# Patient Record
Sex: MALE | Race: BLACK OR AFRICAN AMERICAN | NOT HISPANIC OR LATINO | Employment: OTHER | URBAN - METROPOLITAN AREA
[De-identification: names, ages, dates, MRNs, and addresses within clinical notes are randomized per-mention and may not be internally consistent; named-entity substitution may affect disease eponyms.]

---

## 2021-12-06 ENCOUNTER — APPOINTMENT (OUTPATIENT)
Dept: GENERAL RADIOLOGY | Facility: CLINIC | Age: 41
End: 2021-12-06
Attending: EMERGENCY MEDICINE
Payer: COMMERCIAL

## 2021-12-06 ENCOUNTER — HOSPITAL ENCOUNTER (EMERGENCY)
Facility: CLINIC | Age: 41
Discharge: HOME | End: 2021-12-06
Attending: EMERGENCY MEDICINE | Admitting: EMERGENCY MEDICINE
Payer: COMMERCIAL

## 2021-12-06 VITALS
RESPIRATION RATE: 18 BRPM | SYSTOLIC BLOOD PRESSURE: 170 MMHG | DIASTOLIC BLOOD PRESSURE: 106 MMHG | TEMPERATURE: 97.4 F | HEART RATE: 76 BPM | OXYGEN SATURATION: 96 %

## 2021-12-06 DIAGNOSIS — M79.622 PAIN OF LEFT UPPER ARM: ICD-10-CM

## 2021-12-06 DIAGNOSIS — I10 HYPERTENSION, UNSPECIFIED TYPE: ICD-10-CM

## 2021-12-06 DIAGNOSIS — E11.65 TYPE 2 DIABETES MELLITUS WITH HYPERGLYCEMIA, WITHOUT LONG-TERM CURRENT USE OF INSULIN (H): ICD-10-CM

## 2021-12-06 DIAGNOSIS — T14.8XXA MUSCLE STRAIN: ICD-10-CM

## 2021-12-06 LAB
ANION GAP SERPL CALCULATED.3IONS-SCNC: 6 MMOL/L (ref 3–14)
ATRIAL RATE - MUSE: 64 BPM
BASOPHILS # BLD AUTO: 0 10E3/UL (ref 0–0.2)
BASOPHILS NFR BLD AUTO: 0 %
BUN SERPL-MCNC: 13 MG/DL (ref 7–30)
CALCIUM SERPL-MCNC: 9.2 MG/DL (ref 8.5–10.1)
CHLORIDE BLD-SCNC: 104 MMOL/L (ref 94–109)
CO2 SERPL-SCNC: 27 MMOL/L (ref 20–32)
CREAT SERPL-MCNC: 1.18 MG/DL (ref 0.66–1.25)
DIASTOLIC BLOOD PRESSURE - MUSE: NORMAL MMHG
EOSINOPHIL # BLD AUTO: 0.3 10E3/UL (ref 0–0.7)
EOSINOPHIL NFR BLD AUTO: 3 %
ERYTHROCYTE [DISTWIDTH] IN BLOOD BY AUTOMATED COUNT: 12.6 % (ref 10–15)
GFR SERPL CREATININE-BSD FRML MDRD: 77 ML/MIN/1.73M2
GLUCOSE BLD-MCNC: 124 MG/DL (ref 70–99)
HCT VFR BLD AUTO: 44.8 % (ref 40–53)
HGB BLD-MCNC: 15 G/DL (ref 13.3–17.7)
IMM GRANULOCYTES # BLD: 0 10E3/UL
IMM GRANULOCYTES NFR BLD: 0 %
INTERPRETATION ECG - MUSE: NORMAL
LYMPHOCYTES # BLD AUTO: 2.7 10E3/UL (ref 0.8–5.3)
LYMPHOCYTES NFR BLD AUTO: 31 %
MCH RBC QN AUTO: 30.8 PG (ref 26.5–33)
MCHC RBC AUTO-ENTMCNC: 33.5 G/DL (ref 31.5–36.5)
MCV RBC AUTO: 92 FL (ref 78–100)
MONOCYTES # BLD AUTO: 0.8 10E3/UL (ref 0–1.3)
MONOCYTES NFR BLD AUTO: 9 %
NEUTROPHILS # BLD AUTO: 4.9 10E3/UL (ref 1.6–8.3)
NEUTROPHILS NFR BLD AUTO: 57 %
NRBC # BLD AUTO: 0 10E3/UL
NRBC BLD AUTO-RTO: 0 /100
P AXIS - MUSE: 13 DEGREES
PLATELET # BLD AUTO: 209 10E3/UL (ref 150–450)
POTASSIUM BLD-SCNC: 3.8 MMOL/L (ref 3.4–5.3)
PR INTERVAL - MUSE: 178 MS
QRS DURATION - MUSE: 92 MS
QT - MUSE: 408 MS
QTC - MUSE: 420 MS
R AXIS - MUSE: -33 DEGREES
RBC # BLD AUTO: 4.87 10E6/UL (ref 4.4–5.9)
SODIUM SERPL-SCNC: 137 MMOL/L (ref 133–144)
SYSTOLIC BLOOD PRESSURE - MUSE: NORMAL MMHG
T AXIS - MUSE: -16 DEGREES
TROPONIN I SERPL HS-MCNC: 10 NG/L
VENTRICULAR RATE- MUSE: 64 BPM
WBC # BLD AUTO: 8.8 10E3/UL (ref 4–11)

## 2021-12-06 PROCEDURE — 36415 COLL VENOUS BLD VENIPUNCTURE: CPT | Performed by: EMERGENCY MEDICINE

## 2021-12-06 PROCEDURE — 250N000013 HC RX MED GY IP 250 OP 250 PS 637: Performed by: EMERGENCY MEDICINE

## 2021-12-06 PROCEDURE — 71046 X-RAY EXAM CHEST 2 VIEWS: CPT

## 2021-12-06 PROCEDURE — 80048 BASIC METABOLIC PNL TOTAL CA: CPT | Performed by: EMERGENCY MEDICINE

## 2021-12-06 PROCEDURE — 84484 ASSAY OF TROPONIN QUANT: CPT | Performed by: EMERGENCY MEDICINE

## 2021-12-06 PROCEDURE — 85004 AUTOMATED DIFF WBC COUNT: CPT | Performed by: EMERGENCY MEDICINE

## 2021-12-06 PROCEDURE — 99285 EMERGENCY DEPT VISIT HI MDM: CPT | Mod: 25

## 2021-12-06 PROCEDURE — 93005 ELECTROCARDIOGRAM TRACING: CPT

## 2021-12-06 RX ORDER — IBUPROFEN 200 MG
400 TABLET ORAL ONCE
Status: COMPLETED | OUTPATIENT
Start: 2021-12-06 | End: 2021-12-06

## 2021-12-06 RX ORDER — METHOCARBAMOL 750 MG/1
750 TABLET, FILM COATED ORAL 4 TIMES DAILY PRN
Qty: 15 TABLET | Refills: 0 | Status: SHIPPED | OUTPATIENT
Start: 2021-12-06 | End: 2022-02-10

## 2021-12-06 RX ADMIN — IBUPROFEN 400 MG: 200 TABLET, FILM COATED ORAL at 09:31

## 2021-12-06 ASSESSMENT — ENCOUNTER SYMPTOMS
WEAKNESS: 0
SHORTNESS OF BREATH: 0
COUGH: 0

## 2021-12-06 NOTE — ED TRIAGE NOTES
Presents to the ED with left arm pain. Began yesterday. Denies known injury. States pain is worse with muscle movement of the arm.

## 2021-12-06 NOTE — ED PROVIDER NOTES
History   Chief Complaint:  Arm Pain    The history is provided by the patient.      Odilon Suggs is a 41 year old male with history of type 2 diabetes and hypertension who presents with left arm pain. The pain started yesterday and is located on his arm pit and travels down. Pain is also on the outside of his arm. The pain is constant and worseness with movement. She has strength in his arm. He denies having a car accident. He does not have any other symptoms including shortness of breath or cough. His blood pressures ranges from 120-150. He is not taking any medication for his diabetes.     Review of Systems   Respiratory: Negative for cough and shortness of breath.    Musculoskeletal:        Arm pain   Neurological: Negative for weakness.   All other systems reviewed and are negative.        Allergies:  No Known Allergies    Medications:  ACETAMINOPHEN PO  meclizine   metoprolol   oxycodone   prochlorperazine   Losartan      Past Medical History:     Type 2 diabetes    GERD without esophagitis  Essential hypertension    Past Surgical History:    The patient does not have any pertinent past surgical history.    Social History:  Presents alone  Drives a semi truck     Physical Exam     Patient Vitals for the past 24 hrs:   BP Temp Pulse Resp SpO2   12/06/21 0808 (!) 170/106 97.4  F (36.3  C) 76 18 96 %       Physical Exam  General: The patient is alert, in no respiratory distress.    HENT: Mucous membranes moist.    Cardiovascular: Regular rate and rhythm. Good pulses in all four extremities. Normal capillary refill and skin turgor.     Respiratory: Lungs are clear. No nasal flaring. No retractions. No wheezing, no crackles.    Gastrointestinal: Abdomen soft. No guarding, no rebound. No palpable hernias.     Musculoskeletal: No gross deformity. Pain in left arm with range of motion.     Skin: No rashes or petechiae.     Neurologic: The patient is alert and oriented x3. GCS 15. No testable cranial nerve  deficit. Follows commands with clear and appropriate speech. Gives appropriate answers. Good strength in all extremities. No gross neurologic deficit. Gross sensation intact. Pupils are round and reactive. No meningismus.     Lymphatic: No cervical adenopathy. No lower extremity swelling. No arm swelling.     Psychiatric: The patient is non-tearful.      Emergency Department Course   ECG  ECG obtained at 0835, ECG read at 0914  Normal sinus rhythm  Left axis deviation  Nonspecific T waves in III   Abnormal ECG   No prior ECG for comparison.  Rate 64 bpm. IN interval 178 ms. QRS duration 92 ms. QT/QTc 408/420 ms. P-R-T axes 13 -33 -16.     Imaging:  XR Chest 2 Views   Final Result   IMPRESSION: PA and lateral views of the chest were obtained.   Cardiomediastinal silhouette is within normal limits. No suspicious   focal pulmonary opacities. No significant pleural effusion or   pneumothorax.      CAT MCCRACKEN MD            SYSTEM ID:  [id number]        Report per radiology    Laboratory:  Labs Ordered and Resulted from Time of ED Arrival to Time of ED Departure   BASIC METABOLIC PANEL - Abnormal       Result Value    Sodium 137      Potassium 3.8      Chloride 104      Carbon Dioxide (CO2) 27      Anion Gap 6      Urea Nitrogen 13      Creatinine 1.18      Calcium 9.2      Glucose 124 (*)     GFR Estimate 77     TROPONIN I - Normal    Troponin I High Sensitivity 10     CBC WITH PLATELETS AND DIFFERENTIAL    WBC Count 8.8      RBC Count 4.87      Hemoglobin 15.0      Hematocrit 44.8      MCV 92      MCH 30.8      MCHC 33.5      RDW 12.6      Platelet Count 209      % Neutrophils 57      % Lymphocytes 31      % Monocytes 9      % Eosinophils 3      % Basophils 0      % Immature Granulocytes 0      NRBCs per 100 WBC 0      Absolute Neutrophils 4.9      Absolute Lymphocytes 2.7      Absolute Monocytes 0.8      Absolute Eosinophils 0.3      Absolute Basophils 0.0      Absolute Immature Granulocytes 0.0      Absolute  NRBCs 0.0            Emergency Department Course:      Reviewed:  I reviewed nursing notes, vitals, past medical history and Care Everywhere    Assessments:  0920 I obtained history and examined the patient as noted above.   1038 I rechecked the patient and explained findings.  We discussed repeating a troponin but he wanted to hold off.      Interventions:  0931    Ibuprofen     Disposition:  The patient was discharged to home.     Impression & Plan     Medical Decision Making:  The patient does have a few risk factors for heart disease including a history of borderline type 2 diabetes and high blood pressure.  However the patient is complaints of pain did not appear to be a referred pain but an actual pain within his left arm.  The patient says he works as a  has been using that left arm to perform a duty in his job that is repetitive and likely injured it.  He has pain with movement with palpation.  There is no signs of compartment syndrome.  There is no focal bony tenderness to suggest fracture.  I did have her take a prudent strategy and perform a troponin that was negative as well as labs that were reassuring.  The patient is otherwise stable I discussed that treatment wrote him for a muscle relaxant this is most likely musculoskeletal.  I will have him follow-up as an outpatient.  But I do not think that his story is consistent with ACS though he does have some risk factors.  He will need to follow-up with a primary care doctor as an outpatient.  His heart score is low and puts him in a lower risk category.        Diagnosis:    ICD-10-CM    1. Pain of left upper arm  M79.622    2. Hypertension, unspecified type  I10    3. Type 2 diabetes mellitus with hyperglycemia, without long-term current use of insulin (H)  E11.65    4. Muscle strain  T14.8XXA        Discharge Medications:  Discharge Medication List as of 12/6/2021 11:19 AM      START taking these medications    Details   methocarbamol  (ROBAXIN) 750 MG tablet Take 1 tablet (750 mg) by mouth 4 times daily as needed for muscle spasms, Disp-15 tablet, R-0, Local Print             Scribe Disclosure:  I, Rhonda Moreau, am serving as a scribe at 9:20 AM on 12/6/2021 to document services personally performed by Suhas Freedman MD based on my observations and the provider's statements to me.              Suhas Freedman MD  12/06/21 [id number]

## 2022-02-10 ENCOUNTER — HOSPITAL ENCOUNTER (EMERGENCY)
Facility: CLINIC | Age: 42
Discharge: HOME | End: 2022-02-10
Attending: EMERGENCY MEDICINE | Admitting: EMERGENCY MEDICINE
Payer: COMMERCIAL

## 2022-02-10 VITALS
RESPIRATION RATE: 18 BRPM | DIASTOLIC BLOOD PRESSURE: 76 MMHG | OXYGEN SATURATION: 97 % | HEART RATE: 87 BPM | TEMPERATURE: 97.8 F | SYSTOLIC BLOOD PRESSURE: 117 MMHG

## 2022-02-10 DIAGNOSIS — M54.6 ACUTE RIGHT-SIDED THORACIC BACK PAIN: ICD-10-CM

## 2022-02-10 DIAGNOSIS — K21.9 GASTROESOPHAGEAL REFLUX DISEASE, UNSPECIFIED WHETHER ESOPHAGITIS PRESENT: ICD-10-CM

## 2022-02-10 DIAGNOSIS — R07.89 RIGHT-SIDED CHEST WALL PAIN: ICD-10-CM

## 2022-02-10 PROCEDURE — 99283 EMERGENCY DEPT VISIT LOW MDM: CPT

## 2022-02-10 RX ORDER — METHOCARBAMOL 750 MG/1
750 TABLET, FILM COATED ORAL 4 TIMES DAILY PRN
Qty: 28 TABLET | Refills: 0 | Status: SHIPPED | OUTPATIENT
Start: 2022-02-10

## 2022-02-10 ASSESSMENT — ENCOUNTER SYMPTOMS
VOMITING: 0
ABDOMINAL PAIN: 0
NECK PAIN: 1
BACK PAIN: 1
ARTHRALGIAS: 1
NAUSEA: 0

## 2022-02-10 NOTE — ED PROVIDER NOTES
History   Chief Complaint:  Chest Wall Pain       The history is provided by the patient.      Odilon Suggs is a 41 year old male with history of hypertension and GERD who presents for evaluation of chest wall pain. He reports intermittent pain on the right side of his chest and into his right shoulder, back and neck. The patient notes pain is worse when he is eating, moving, or twisting. He notes that it is painful with inspiration at times and he had shortness of breath intermittently yesterday, but denies any current issues breathing.  No cough or fever.  He reports difficulty opening a window a few days ago, and the pain began when he woke up the next morning. The patient also notes some burning/acid recently in stomach and up to throat. He denies abdominal pain, nausea, vomiting, and rash. The patient reports taking Tylenol without significant improvement.  No exertional worsening.  No associated leg swelling.    Review of Systems   Cardiovascular: Positive for chest pain.   Gastrointestinal: Negative for abdominal pain, nausea and vomiting.   Musculoskeletal: Positive for arthralgias (right shoulder), back pain and neck pain.   Skin: Negative for rash.   All other systems reviewed and are negative.    Allergies:  The patient has no known allergies.     Medications:  Metoprolol  Robaxin  Atorvastatin  Losartan     Past Medical History:     Hypertension    Constipation     Dyslipidemia  Controlled type 2 diabetes  GERD without esophagitis  COVID-19     Social History:  The patient presents with his son.   He arrived by private vehicle.    Physical Exam     Patient Vitals for the past 24 hrs:   BP Temp Temp src Pulse Resp SpO2   02/10/22 1752 117/76 -- -- 87 18 97 %   02/10/22 1728 (!) 145/99 97.8  F (36.6  C) Temporal 90 18 100 %       Physical Exam  Eyes:               Sclera white; Pupils are equal and round  ENT:                External ears and nares normal  CV:                  Rate as above with  regular rhythm   Resp:               Breath sounds clear and equal bilaterally                          Non-labored, no retractions or accessory muscle use  GI:                   Abdomen is soft, non-tender, negative Ingram's, non-distended                          No rebound tenderness or peritoneal features  MS:                  Moves all extremities, tenderness over tense muscles paraspinal neck and rhomboid region on right.  Chest wall tenderness on right reproducing symptoms.  Skin:                Warm and dry, no rash on R back or R chest  Neuro:             Speech is normal and fluent. No apparent deficit.    Emergency Department Course   ECG  ECG obtained at 1742, ECG read at 1745  Normal sinus rhythm with sinus arrhythmia.   Normal ECG.   Compared to prior, dated 12/6/21.  Rate 87 bpm. OH interval 174 ms. QRS duration 88 ms. QT/QTc 370/445 ms. P-R-T axes 54 -21 27.       Emergency Department Course:     Reviewed:  I reviewed nursing notes, vitals, past medical history and Care Everywhere    Assessments:  1731 I obtained history and examined the patient as noted above.   1754 I rechecked the patient and explained findings. At this point I feel that the patient is safe for discharge, and the patient agrees.     Disposition:  The patient was discharged to home.     Impression & Plan     Medical Decision Making:  Odilon Suggs is a 41 year old male presenting for chest wall and shoulder pain. EKG w/o ischemia, dysrhythmia, or pericarditis.  RUQ pathology considered as this can cause referred pain to R should but with no tenderness, nausea or vomiting, biliary pathology, pancreatitis and hepatitis are not suspected.  No current respiratory symptoms or increased work of breathing and lungs are clear.  CXR not indicated.  Presentation is most consistent with muscular etiology of pain given associated tenderness and lack of classic location or associated symptoms for cardiac pathology.  Will treat  symptomatically with muscle relaxants and also omeprazole for symptoms of reflux.  F/u closely with PCP within 1-2 weeks for recheck.  Return immediately if symptoms are worsening or exertional symptoms occur. The patient understood the plan and was discharged home in good condition.     Diagnosis:    ICD-10-CM    1. Acute right-sided thoracic back pain  M54.6    2. Right-sided chest wall pain  R07.89    3. Gastroesophageal reflux disease, unspecified whether esophagitis present  K21.9        Discharge Medications:  New Prescriptions    METHOCARBAMOL (ROBAXIN) 750 MG TABLET    Take 1 tablet (750 mg) by mouth 4 times daily as needed (muscle pain/spasm)    OMEPRAZOLE (PRILOSEC) 20 MG DR CAPSULE    Take 1 capsule (20 mg) by mouth daily       Scribe Disclosure:  I, Maria Isabel Kwong, am serving as a scribe at 5:31 PM on 2/10/2022 to document services personally performed by Paris Ferrara MD based on my observations and the provider's statements to me.            Paris Ferrara MD  02/10/22 4732

## 2022-02-10 NOTE — ED TRIAGE NOTES
Pt reports that he opened a window 3 days ago. Pt reports that he is now having right sided chest wall and right shoulder pain. PT VSS and ABC's intact. PT denies taking anything for pain today.

## 2022-02-11 LAB
ATRIAL RATE - MUSE: 87 BPM
DIASTOLIC BLOOD PRESSURE - MUSE: NORMAL MMHG
INTERPRETATION ECG - MUSE: NORMAL
P AXIS - MUSE: 54 DEGREES
PR INTERVAL - MUSE: 174 MS
QRS DURATION - MUSE: 88 MS
QT - MUSE: 370 MS
QTC - MUSE: 445 MS
R AXIS - MUSE: -21 DEGREES
SYSTOLIC BLOOD PRESSURE - MUSE: NORMAL MMHG
T AXIS - MUSE: 27 DEGREES
VENTRICULAR RATE- MUSE: 87 BPM

## 2024-05-30 ENCOUNTER — APPOINTMENT (OUTPATIENT)
Dept: GENERAL RADIOLOGY | Facility: CLINIC | Age: 44
End: 2024-05-30
Attending: EMERGENCY MEDICINE
Payer: MEDICAID

## 2024-05-30 ENCOUNTER — HOSPITAL ENCOUNTER (EMERGENCY)
Facility: CLINIC | Age: 44
Discharge: HOME | End: 2024-05-31
Attending: EMERGENCY MEDICINE | Admitting: EMERGENCY MEDICINE
Payer: MEDICAID

## 2024-05-30 VITALS
DIASTOLIC BLOOD PRESSURE: 131 MMHG | HEART RATE: 89 BPM | OXYGEN SATURATION: 99 % | HEIGHT: 62 IN | BODY MASS INDEX: 33.67 KG/M2 | SYSTOLIC BLOOD PRESSURE: 207 MMHG | WEIGHT: 182.98 LBS | RESPIRATION RATE: 17 BRPM | TEMPERATURE: 97 F

## 2024-05-30 DIAGNOSIS — I10 POORLY-CONTROLLED HYPERTENSION: ICD-10-CM

## 2024-05-30 DIAGNOSIS — R07.9 CHEST PAIN, UNSPECIFIED TYPE: ICD-10-CM

## 2024-05-30 DIAGNOSIS — R73.9 HYPERGLYCEMIA: ICD-10-CM

## 2024-05-30 DIAGNOSIS — Z91.148 POOR COMPLIANCE WITH MEDICATION: ICD-10-CM

## 2024-05-30 DIAGNOSIS — M25.511 ACUTE PAIN OF RIGHT SHOULDER: ICD-10-CM

## 2024-05-30 LAB
ANION GAP SERPL CALCULATED.3IONS-SCNC: 13 MMOL/L (ref 7–15)
BASOPHILS # BLD AUTO: 0.1 10E3/UL (ref 0–0.2)
BASOPHILS NFR BLD AUTO: 1 %
BUN SERPL-MCNC: 19.2 MG/DL (ref 6–20)
CALCIUM SERPL-MCNC: 9.2 MG/DL (ref 8.6–10)
CHLORIDE SERPL-SCNC: 102 MMOL/L (ref 98–107)
CREAT SERPL-MCNC: 1.3 MG/DL (ref 0.67–1.17)
D DIMER PPP FEU-MCNC: 0.37 UG/ML FEU (ref 0–0.5)
DEPRECATED HCO3 PLAS-SCNC: 24 MMOL/L (ref 22–29)
EGFRCR SERPLBLD CKD-EPI 2021: 70 ML/MIN/1.73M2
EOSINOPHIL # BLD AUTO: 0.3 10E3/UL (ref 0–0.7)
EOSINOPHIL NFR BLD AUTO: 3 %
ERYTHROCYTE [DISTWIDTH] IN BLOOD BY AUTOMATED COUNT: 12.6 % (ref 10–15)
GLUCOSE SERPL-MCNC: 157 MG/DL (ref 70–99)
HCT VFR BLD AUTO: 42.1 % (ref 40–53)
HGB BLD-MCNC: 14.5 G/DL (ref 13.3–17.7)
HOLD SPECIMEN: NORMAL
IMM GRANULOCYTES # BLD: 0 10E3/UL
IMM GRANULOCYTES NFR BLD: 0 %
LYMPHOCYTES # BLD AUTO: 3 10E3/UL (ref 0.8–5.3)
LYMPHOCYTES NFR BLD AUTO: 31 %
MCH RBC QN AUTO: 30 PG (ref 26.5–33)
MCHC RBC AUTO-ENTMCNC: 34.4 G/DL (ref 31.5–36.5)
MCV RBC AUTO: 87 FL (ref 78–100)
MONOCYTES # BLD AUTO: 0.8 10E3/UL (ref 0–1.3)
MONOCYTES NFR BLD AUTO: 8 %
NEUTROPHILS # BLD AUTO: 5.7 10E3/UL (ref 1.6–8.3)
NEUTROPHILS NFR BLD AUTO: 57 %
NRBC # BLD AUTO: 0 10E3/UL
NRBC BLD AUTO-RTO: 0 /100
PLATELET # BLD AUTO: 212 10E3/UL (ref 150–450)
POTASSIUM SERPL-SCNC: 3.9 MMOL/L (ref 3.4–5.3)
RBC # BLD AUTO: 4.83 10E6/UL (ref 4.4–5.9)
SODIUM SERPL-SCNC: 139 MMOL/L (ref 135–145)
TROPONIN T SERPL HS-MCNC: 12 NG/L
TROPONIN T SERPL HS-MCNC: 14 NG/L
WBC # BLD AUTO: 9.9 10E3/UL (ref 4–11)

## 2024-05-30 PROCEDURE — 80048 BASIC METABOLIC PNL TOTAL CA: CPT | Performed by: EMERGENCY MEDICINE

## 2024-05-30 PROCEDURE — 36415 COLL VENOUS BLD VENIPUNCTURE: CPT | Performed by: EMERGENCY MEDICINE

## 2024-05-30 PROCEDURE — 93005 ELECTROCARDIOGRAM TRACING: CPT

## 2024-05-30 PROCEDURE — 99285 EMERGENCY DEPT VISIT HI MDM: CPT | Mod: 25

## 2024-05-30 PROCEDURE — 85025 COMPLETE CBC W/AUTO DIFF WBC: CPT | Performed by: EMERGENCY MEDICINE

## 2024-05-30 PROCEDURE — 71046 X-RAY EXAM CHEST 2 VIEWS: CPT

## 2024-05-30 PROCEDURE — 85379 FIBRIN DEGRADATION QUANT: CPT | Performed by: EMERGENCY MEDICINE

## 2024-05-30 PROCEDURE — 250N000013 HC RX MED GY IP 250 OP 250 PS 637: Performed by: EMERGENCY MEDICINE

## 2024-05-30 PROCEDURE — 84484 ASSAY OF TROPONIN QUANT: CPT | Mod: 91 | Performed by: EMERGENCY MEDICINE

## 2024-05-30 RX ORDER — LOSARTAN POTASSIUM 50 MG/1
50 TABLET ORAL ONCE
Status: COMPLETED | OUTPATIENT
Start: 2024-05-30 | End: 2024-05-30

## 2024-05-30 RX ORDER — MAGNESIUM HYDROXIDE/ALUMINUM HYDROXICE/SIMETHICONE 120; 1200; 1200 MG/30ML; MG/30ML; MG/30ML
30 SUSPENSION ORAL ONCE
Status: COMPLETED | OUTPATIENT
Start: 2024-05-30 | End: 2024-05-30

## 2024-05-30 RX ORDER — HYDROCHLOROTHIAZIDE 12.5 MG/1
12.5 CAPSULE ORAL ONCE
Status: COMPLETED | OUTPATIENT
Start: 2024-05-30 | End: 2024-05-30

## 2024-05-30 RX ORDER — FAMOTIDINE 20 MG/1
20 TABLET, FILM COATED ORAL ONCE
Status: COMPLETED | OUTPATIENT
Start: 2024-05-30 | End: 2024-05-30

## 2024-05-30 RX ADMIN — ALUMINUM HYDROXIDE, MAGNESIUM HYDROXIDE, AND SIMETHICONE 30 ML: 1200; 120; 1200 SUSPENSION ORAL at 20:57

## 2024-05-30 RX ADMIN — FAMOTIDINE 20 MG: 20 TABLET ORAL at 20:57

## 2024-05-30 RX ADMIN — HYDROCHLOROTHIAZIDE 12.5 MG: 12.5 CAPSULE ORAL at 21:31

## 2024-05-30 RX ADMIN — LOSARTAN POTASSIUM 50 MG: 50 TABLET, FILM COATED ORAL at 21:31

## 2024-05-30 ASSESSMENT — ACTIVITIES OF DAILY LIVING (ADL)
ADLS_ACUITY_SCORE: 35

## 2024-05-30 ASSESSMENT — COLUMBIA-SUICIDE SEVERITY RATING SCALE - C-SSRS
1. IN THE PAST MONTH, HAVE YOU WISHED YOU WERE DEAD OR WISHED YOU COULD GO TO SLEEP AND NOT WAKE UP?: NO
6. HAVE YOU EVER DONE ANYTHING, STARTED TO DO ANYTHING, OR PREPARED TO DO ANYTHING TO END YOUR LIFE?: NO
2. HAVE YOU ACTUALLY HAD ANY THOUGHTS OF KILLING YOURSELF IN THE PAST MONTH?: NO

## 2024-05-31 LAB
ATRIAL RATE - MUSE: 71 BPM
DIASTOLIC BLOOD PRESSURE - MUSE: NORMAL MMHG
INTERPRETATION ECG - MUSE: NORMAL
P AXIS - MUSE: 13 DEGREES
PR INTERVAL - MUSE: 156 MS
QRS DURATION - MUSE: 94 MS
QT - MUSE: 388 MS
QTC - MUSE: 421 MS
R AXIS - MUSE: -38 DEGREES
SYSTOLIC BLOOD PRESSURE - MUSE: NORMAL MMHG
T AXIS - MUSE: -4 DEGREES
VENTRICULAR RATE- MUSE: 71 BPM

## 2024-05-31 RX ORDER — LOSARTAN POTASSIUM AND HYDROCHLOROTHIAZIDE 12.5; 5 MG/1; MG/1
1 TABLET ORAL DAILY
Qty: 30 TABLET | Refills: 0 | Status: SHIPPED | OUTPATIENT
Start: 2024-05-31

## 2024-05-31 NOTE — ED PROVIDER NOTES
"  Emergency Department Note      History of Present Illness     Chief Complaint  Chest Pain    HPI  Odilon Suggs is a 43 year old male with history of hypertension who presents for right-sided chest pain. The pain began three days ago and he describes it as a spasm that feels like it is located deep inside of his chest. The pain radiates down his right arm. He denies any difficulty breathing, fevers, chills, abdominal pain, exertion that may have brought the pain on, or any triggers that make it worse. He has not taken his blood pressure medications for the past two months.  He is also not taking metformin.  He is not a smoker and does not have personal or family histories of heart attacks or clotting disorders.  He does plan on following with his primary care provider but has been having difficulty for unclear reasons.    Independent Historian  None    Review of External Notes  I reviewed multiple outpatient clinic notes was demonstrate ongoing significantly high blood pressure on outpatient clinic evaluations.    Past Medical History   Medical History and Problem List  Hypertension     Medications  Meclizine   Robaxin   Metoprolol  Compazine   Losartan  Metformin   Physical Exam   Patient Vitals for the past 24 hrs:   BP Temp Temp src Pulse Resp SpO2 Height Weight   05/30/24 2154 (!) 207/131 -- -- -- -- -- -- --   05/30/24 2021 (!) 203/129 97  F (36.1  C) Temporal 89 17 99 % 1.58 m (5' 2.21\") 83 kg (182 lb 15.7 oz)     Physical Exam  General: Adult male sitting upright  Eyes: PERRL, Conjunctive within normal limits  ENT: Moist mucous membranes, oropharynx clear.   CV: Normal S1S2, no murmur, rub or gallop. Regular rate and rhythm  Resp: Clear to auscultation bilaterally, no wheezes, rales or rhonchi. Normal respiratory effort.  GI: Abdomen is soft, nontender and nondistended. No palpable masses. No rebound or guarding.  MSK: No edema. Nontender. Normal active range of motion.  Skin: Warm and dry. No rashes " or lesions or ecchymoses on visible skin.  Neuro: Alert and oriented. Responds appropriately to all questions and commands. No focal findings appreciated. Normal muscle tone.  Psych: Normal mood and affect. Pleasant.   Diagnostics   Lab Results   Labs Ordered and Resulted from Time of ED Arrival to Time of ED Departure   BASIC METABOLIC PANEL - Abnormal       Result Value    Sodium 139      Potassium 3.9      Chloride 102      Carbon Dioxide (CO2) 24      Anion Gap 13      Urea Nitrogen 19.2      Creatinine 1.30 (*)     GFR Estimate 70      Calcium 9.2      Glucose 157 (*)    TROPONIN T, HIGH SENSITIVITY - Normal    Troponin T, High Sensitivity 12     D DIMER QUANTITATIVE - Normal    D-Dimer Quantitative 0.37     CBC WITH PLATELETS AND DIFFERENTIAL    WBC Count 9.9      RBC Count 4.83      Hemoglobin 14.5      Hematocrit 42.1      MCV 87      MCH 30.0      MCHC 34.4      RDW 12.6      Platelet Count 212      % Neutrophils 57      % Lymphocytes 31      % Monocytes 8      % Eosinophils 3      % Basophils 1      % Immature Granulocytes 0      NRBCs per 100 WBC 0      Absolute Neutrophils 5.7      Absolute Lymphocytes 3.0      Absolute Monocytes 0.8      Absolute Eosinophils 0.3      Absolute Basophils 0.1      Absolute Immature Granulocytes 0.0      Absolute NRBCs 0.0       Imaging  Chest XR,  PA & LAT   Final Result   IMPRESSION: No evidence of active cardiopulmonary disease.         EKG   ECG results from 05/30/24   EKG 12-lead, tracing only     Value    Systolic Blood Pressure     Diastolic Blood Pressure     Ventricular Rate 71    Atrial Rate 71    WY Interval 156    QRS Duration 94        QTc 421    P Axis 13    R AXIS -38    T Axis -4    Interpretation ECG      Sinus rhythm  Left axis deviation  Minimal voltage criteria for LVH, may be normal variant ( Tatamy product )  Nonspecific ST abnormality  Abnormal ECG  When compared with ECG of 10-FEB-2022 17:42,  No significant change was found        Independent  "Interpretation  CXR: No pneumothorax, infiltrate, pleural effusion, pulmonary edema, or cardiomegaly.  ED Course    Medications Administered  Medications   famotidine (PEPCID) tablet 20 mg (20 mg Oral $Given 5/30/24 2057)   alum & mag hydroxide-simethicone (MAALOX) suspension 30 mL (30 mLs Oral $Given 5/30/24 2057)   losartan (COZAAR) tablet 50 mg (50 mg Oral $Given 5/30/24 2131)   hydrochlorothiazide (MICROZIDE) capsule 12.5 mg (12.5 mg Oral $Given 5/30/24 2131)     Procedures  None      Discussion of Management  None    Social Determinants of Health adding to complexity of care  None    ED Course  ED Course as of 05/30/24 2208   Thu May 30, 2024   2119 I obtained history and examined the patient as noted above.    I reassessed the patient.  He notes he is feeling \"fine\" on reassessment.  He feels comfortable to plan for discharge home.  He understands the importance of taking his blood pressure medications.    Medical Decision Making / Diagnosis   CMS Diagnoses: None    MIPS  None    [facility]  Odilon Suggs is a 43 year old male with poorly controlled high blood pressure likely due to poor medication compliance who presents emergency department with concerns for 3 days of intermittent chest pain on the right and left including into his right shoulder.  He has no findings of acute ischemia injury on ECG.  He has a normal troponin despite long duration of symptoms.  His symptoms are not consistent with CHF.  PE seems unlikely.  D-dimer is normal.  Troponin stayed stable after serial testing.  He remained hypertensive in the emergency department.  His baseline blood pressure medications were initiated.  Given in chart review that his blood pressures have been elevated to a similar degree over months, did not seem wise to suddenly lower his blood pressure.  He is given a prescription for his baseline blood pressure medications and the importance of taking them daily as underscored.  He is also supposed to be on " metformin and does have mildly elevated blood sugar here.  This can also be initiated again.  At this time, my suspicion is low for hypertensive emergency or necessity for admission to hospital for further care.  He should see his primary care provider within 3 days.  Return precautions discussed.  All questions answered prior to discharge.    Disposition  The patient was discharged.     ICD-10 Codes:    ICD-10-CM    1. Poorly-controlled hypertension  I10       2. Chest pain, unspecified type  R07.9       3. Acute pain of right shoulder  M25.511       4. Hyperglycemia  R73.9       5. Poor compliance with medication  Z91.148            Discharge Medications  Discharge Medication List as of 5/31/2024 12:10 AM        START taking these medications    Details   losartan-hydrochlorothiazide (HYZAAR) 50-12.5 MG tablet Take 1 tablet by mouth daily, Disp-30 tablet, R-0, E-Prescribe      metFORMIN (GLUCOPHAGE) 500 MG tablet Take 1 tablet (500 mg) by mouth 2 times daily (with meals), Disp-60 tablet, R-0, E-Prescribe           Scribe Disclosure:  I, Sheba Stevenson, am serving as a scribe at 8:44 PM on 5/30/2024 to document services personally performed by Sumi Roth MD based on my observations and the provider's statements to me.      Sumi Roth MD  05/31/24 0921

## 2024-05-31 NOTE — ED TRIAGE NOTES
Pt comes in with chest pain for about 3 days. He states that the pain comes and goes.  He also states that he is also having some pain on the right side which is making if difficult to sleep.  Blood pressure is high in triage 203/129     Triage Assessment (Adult)       Row Name 05/30/24 2019          Triage Assessment    Airway WDL WDL        Respiratory WDL    Respiratory WDL WDL        Cardiac WDL    Cardiac WDL X;chest pain

## 2024-05-31 NOTE — DISCHARGE INSTRUCTIONS
Discharge Instructions  Hypertension - High Blood Pressure    During you visit to the Emergency Department, your blood pressure was higher than the recommended blood pressure.  This may be related to stress, pain, medication or other temporary conditions. In these cases, your blood pressure may return to normal on its own. If you have a history of high blood pressure, you may need to have your provider adjust your medications. Sometimes, your high measurement here may indicate that you have developed high blood pressure that will stay high unless it is treated. As a general rule, high blood pressure causes problems over years rather than days, weeks, or months. So, while it is important to treat blood pressure, it is rarely important to treat blood pressure immediately. Occasionally we will begin a medication in the Emergency Department; more often we will recommend close follow-up for medications with a primary doctor/clinic.    Generally, every Emergency Department visit should have a follow-up clinic visit with either a primary or a specialty clinic/provider. Please follow-up as instructed by your emergency provider today.    Return to the Emergency Department if you start to have:  A severe headache.  Chest pain.  Shortness of breath.  Weakness or numbness that affects one part of the body.  Confusion.  Vision changes.  Significant swelling of legs and/or eyes.  A reaction to any medication started in the Emergency Department.    What can I do to help myself?  Avoid alcohol.  Take any blood pressure medicine that you are prescribed.  Get a good night s sleep.  Lower your salt intake.  Exercise.  Lose weight.  Manage stress.  See your doctor regularly    If blood pressure medication was started in the Emergency Department:  The medicine may not have an immediate effect. The body and brain determine what blood pressure you have. The medicine s job is to retrain the body s  thermostat  to a lower blood  pressure.  You will need to follow up with your provider to see how this medicine is working for you.  If you were given a prescription for medicine here today, be sure to read all of the information (including the package insert) that comes with your prescription.  This will include important information about the medicine, its side effects, and any warnings that you need to know about.  The pharmacist who fills the prescription can provide more information and answer questions you may have about the medicine.  If you have questions or concerns that the pharmacist cannot address, please call or return to the Emergency Department.   Remember that you can always come back to the Emergency Department if you are not able to see your regular provider in the amount of time listed above, if you get any new symptoms, or if there is anything that worries you.     Discharge Instructions  Hyperglycemia, High Blood Sugar    Today we found your blood sugar (glucose) was high. This may mean that you have developed diabetes, or if you already know that you have diabetes, it may mean that your diabetes is not as well controlled as it should be. Sometimes blood sugar can be high temporarily and it is not diabetes. Signs of elevated blood sugar include increased thirst, frequent urination (peeing), blurred vision, fatigue, unexplained weight loss, poor wound healing, and frequent infections.    We sometimes give medicine in the Emergency Department to lower the blood sugar. We may also prescribe medicine for you to use at home, or increase the medicine that you already take. While we do not like to see your blood sugar high, it is much more dangerous to let your blood sugar get too low, so it is reasonable to take time to bring it down, or to wait and watch to see if it comes down on its own.    Generally, every Emergency Department visit should have a follow-up clinic visit with either a primary or a specialty clinic/provider.  Please follow-up as instructed by your emergency provider today.     Return to the Emergency Department if you develop:  Vomiting (throwing up).  Confusion, disorientation, or being unable to wake up.  Severe weakness or illness.  Abdominal (belly) pain.    What can I do to help myself?  Check your blood sugar as instructed by your provider.  Take medications prescribed by your provider.  Follow a diabetic diet (low fat, low concentrated sweets, high fiber).  Exercise regularly.  Moderate or eliminate alcohol use.  Stop smoking.    Diabetes: Diabetes mellitus is a disease in which the body cannot regulate the amount of sugar (glucose) in the blood. Insulin allows glucose to move out of the blood into cells throughout the body where it is used for fuel. People with diabetes do not produce enough insulin (type 1 diabetes), or cannot use insulin properly (type 2 diabetes), or both. This starves the cells that need the glucose for fuel, and also harms certain organs and tissues exposed to the high glucose levels.  Over a long period of time, uncontrolled diabetes can lead to heart and blood vessel disease, blindness, kidney failure, foot ulcers and many other problems.          About 17 million Americans (6.2% of adults) have diabetes. We think that about one third of adults with diabetes do not know they have diabetes.  The incidence of diabetes is increasing rapidly. This increase is due to many factors, but the most significant are our increasing weight and decreased activity levels.     Diabetes can be a very serious and life-threatening illness if not treated.  If you were given a prescription for medicine here today, be sure to read all of the information (including the package insert) that comes with your prescription.  This will include important information about the medicine, its side effects, and any warnings that you need to know about.  The pharmacist who fills the prescription can provide more information  and answer questions you may have about the medicine.  If you have questions or concerns that the pharmacist cannot address, please call or return to the Emergency Department.   Remember that you can always come back to the Emergency Department if you are not able to see your regular provider in the amount of time listed above, if you get any new symptoms, or if there is anything that worries you.